# Patient Record
Sex: MALE | Race: BLACK OR AFRICAN AMERICAN | URBAN - METROPOLITAN AREA
[De-identification: names, ages, dates, MRNs, and addresses within clinical notes are randomized per-mention and may not be internally consistent; named-entity substitution may affect disease eponyms.]

---

## 2019-08-20 ENCOUNTER — HOSPITAL ENCOUNTER (OUTPATIENT)
Age: 64
Discharge: HOME | End: 2019-08-22
Payer: COMMERCIAL

## 2019-08-20 LAB — PROSTATE SPECIFIC ANTIGEN: 7.21 NG/ML (ref 0–4)

## 2019-08-20 PROCEDURE — 84153 ASSAY OF PSA TOTAL: CPT

## 2019-10-08 ENCOUNTER — HOSPITAL ENCOUNTER (OUTPATIENT)
Age: 64
Discharge: HOME | End: 2019-10-10
Payer: COMMERCIAL

## 2019-10-08 PROCEDURE — 88305 TISSUE EXAM BY PATHOLOGIST: CPT

## 2019-10-15 ENCOUNTER — HOSPITAL ENCOUNTER (OUTPATIENT)
Age: 64
Discharge: HOME | End: 2019-10-17
Payer: COMMERCIAL

## 2019-10-15 LAB
BUN BLDV-MCNC: 20 MG/DL (ref 8–23)
CREAT SERPL-MCNC: 1.3 MG/DL (ref 0.7–1.2)
GFR AFRICAN AMERICAN: >60
GFR NON-AFRICAN AMERICAN: >60 ML/MIN/1.73

## 2019-10-15 PROCEDURE — 82565 ASSAY OF CREATININE: CPT

## 2019-10-15 PROCEDURE — 84520 ASSAY OF UREA NITROGEN: CPT

## 2021-04-28 ENCOUNTER — APPOINTMENT (OUTPATIENT)
Dept: CT IMAGING | Age: 66
End: 2021-04-28
Payer: COMMERCIAL

## 2021-04-28 ENCOUNTER — HOSPITAL ENCOUNTER (INPATIENT)
Age: 66
LOS: 3 days | Discharge: HOME | DRG: 177 | End: 2021-05-01
Attending: STUDENT IN AN ORGANIZED HEALTH CARE EDUCATION/TRAINING PROGRAM | Admitting: STUDENT IN AN ORGANIZED HEALTH CARE EDUCATION/TRAINING PROGRAM
Payer: COMMERCIAL

## 2021-04-28 ENCOUNTER — HOSPITAL ENCOUNTER (EMERGENCY)
Age: 66
Discharge: ANOTHER HOSPITAL | End: 2021-04-28
Attending: EMERGENCY MEDICINE
Payer: COMMERCIAL

## 2021-04-28 ENCOUNTER — APPOINTMENT (OUTPATIENT)
Dept: GENERAL RADIOLOGY | Age: 66
End: 2021-04-28
Payer: COMMERCIAL

## 2021-04-28 VITALS
DIASTOLIC BLOOD PRESSURE: 77 MMHG | HEART RATE: 88 BPM | WEIGHT: 248 LBS | SYSTOLIC BLOOD PRESSURE: 128 MMHG | TEMPERATURE: 96.1 F | BODY MASS INDEX: 34.72 KG/M2 | RESPIRATION RATE: 16 BRPM | HEIGHT: 71 IN | OXYGEN SATURATION: 95 %

## 2021-04-28 DIAGNOSIS — U07.1 PNEUMONIA DUE TO COVID-19 VIRUS: Primary | ICD-10-CM

## 2021-04-28 DIAGNOSIS — J12.82 PNEUMONIA DUE TO COVID-19 VIRUS: Primary | ICD-10-CM

## 2021-04-28 DIAGNOSIS — J96.01 ACUTE RESPIRATORY FAILURE WITH HYPOXIA (HCC): ICD-10-CM

## 2021-04-28 LAB
ALBUMIN SERPL-MCNC: 3.6 G/DL (ref 3.5–5.2)
ALP BLD-CCNC: 91 U/L (ref 40–129)
ALT SERPL-CCNC: 37 U/L (ref 0–40)
ANION GAP SERPL CALCULATED.3IONS-SCNC: 10 MMOL/L (ref 7–16)
ANION GAP SERPL CALCULATED.3IONS-SCNC: 12 MMOL/L (ref 7–16)
AST SERPL-CCNC: 36 U/L (ref 0–39)
BASOPHILS ABSOLUTE: 0 E9/L (ref 0–0.2)
BASOPHILS ABSOLUTE: 0 E9/L (ref 0–0.2)
BASOPHILS RELATIVE PERCENT: 0 % (ref 0–2)
BASOPHILS RELATIVE PERCENT: 0 % (ref 0–2)
BILIRUB SERPL-MCNC: 0.7 MG/DL (ref 0–1.2)
BUN BLDV-MCNC: 22 MG/DL (ref 6–23)
BUN BLDV-MCNC: 26 MG/DL (ref 6–23)
CALCIUM SERPL-MCNC: 10.6 MG/DL (ref 8.6–10.2)
CALCIUM SERPL-MCNC: 10.8 MG/DL (ref 8.6–10.2)
CHLORIDE BLD-SCNC: 99 MMOL/L (ref 98–107)
CHLORIDE BLD-SCNC: 99 MMOL/L (ref 98–107)
CO2: 22 MMOL/L (ref 22–29)
CO2: 22 MMOL/L (ref 22–29)
CREAT SERPL-MCNC: 1.3 MG/DL (ref 0.7–1.2)
CREAT SERPL-MCNC: 1.3 MG/DL (ref 0.7–1.2)
EKG ATRIAL RATE: 94 BPM
EKG P AXIS: 14 DEGREES
EKG P-R INTERVAL: 182 MS
EKG Q-T INTERVAL: 340 MS
EKG QRS DURATION: 82 MS
EKG QTC CALCULATION (BAZETT): 425 MS
EKG R AXIS: -10 DEGREES
EKG T AXIS: 17 DEGREES
EKG VENTRICULAR RATE: 94 BPM
EOSINOPHILS ABSOLUTE: 0 E9/L (ref 0.05–0.5)
EOSINOPHILS ABSOLUTE: 0 E9/L (ref 0.05–0.5)
EOSINOPHILS RELATIVE PERCENT: 0 % (ref 0–6)
EOSINOPHILS RELATIVE PERCENT: 0 % (ref 0–6)
GFR AFRICAN AMERICAN: >60
GFR AFRICAN AMERICAN: >60
GFR NON-AFRICAN AMERICAN: >60 ML/MIN/1.73
GFR NON-AFRICAN AMERICAN: >60 ML/MIN/1.73
GLUCOSE BLD-MCNC: 283 MG/DL (ref 74–99)
GLUCOSE BLD-MCNC: 365 MG/DL (ref 74–99)
HBA1C MFR BLD: 9 % (ref 4–5.6)
HCT VFR BLD CALC: 40.7 % (ref 37–54)
HCT VFR BLD CALC: 42.1 % (ref 37–54)
HEMOGLOBIN: 13.9 G/DL (ref 12.5–16.5)
HEMOGLOBIN: 14.3 G/DL (ref 12.5–16.5)
LYMPHOCYTES ABSOLUTE: 0.3 E9/L (ref 1.5–4)
LYMPHOCYTES ABSOLUTE: 0.34 E9/L (ref 1.5–4)
LYMPHOCYTES RELATIVE PERCENT: 7 % (ref 20–42)
LYMPHOCYTES RELATIVE PERCENT: 8.7 % (ref 20–42)
MCH RBC QN AUTO: 30 PG (ref 26–35)
MCH RBC QN AUTO: 30 PG (ref 26–35)
MCHC RBC AUTO-ENTMCNC: 34 % (ref 32–34.5)
MCHC RBC AUTO-ENTMCNC: 34.2 % (ref 32–34.5)
MCV RBC AUTO: 87.7 FL (ref 80–99.9)
MCV RBC AUTO: 88.3 FL (ref 80–99.9)
METER GLUCOSE: 369 MG/DL (ref 74–99)
MONOCYTES ABSOLUTE: 0.19 E9/L (ref 0.1–0.95)
MONOCYTES ABSOLUTE: 0.3 E9/L (ref 0.1–0.95)
MONOCYTES RELATIVE PERCENT: 5.2 % (ref 2–12)
MONOCYTES RELATIVE PERCENT: 7 % (ref 2–12)
NEUTROPHILS ABSOLUTE: 3.27 E9/L (ref 1.8–7.3)
NEUTROPHILS ABSOLUTE: 3.7 E9/L (ref 1.8–7.3)
NEUTROPHILS RELATIVE PERCENT: 86 % (ref 43–80)
NEUTROPHILS RELATIVE PERCENT: 86.1 % (ref 43–80)
NUCLEATED RED BLOOD CELLS: 0 /100 WBC
PDW BLD-RTO: 11.6 FL (ref 11.5–15)
PDW BLD-RTO: 11.6 FL (ref 11.5–15)
PLATELET # BLD: 183 E9/L (ref 130–450)
PLATELET # BLD: 195 E9/L (ref 130–450)
PMV BLD AUTO: 9.6 FL (ref 7–12)
PMV BLD AUTO: 9.8 FL (ref 7–12)
POLYCHROMASIA: ABNORMAL
POTASSIUM REFLEX MAGNESIUM: 5 MMOL/L (ref 3.5–5)
POTASSIUM SERPL-SCNC: 4.7 MMOL/L (ref 3.5–5)
PROCALCITONIN: 0.17 NG/ML (ref 0–0.08)
RBC # BLD: 4.64 E12/L (ref 3.8–5.8)
RBC # BLD: 4.77 E12/L (ref 3.8–5.8)
RBC # BLD: NORMAL 10*6/UL
SARS-COV-2, NAAT: DETECTED
SODIUM BLD-SCNC: 131 MMOL/L (ref 132–146)
SODIUM BLD-SCNC: 133 MMOL/L (ref 132–146)
TOTAL PROTEIN: 6.9 G/DL (ref 6.4–8.3)
TROPONIN: <0.01 NG/ML (ref 0–0.03)
WBC # BLD: 3.8 E9/L (ref 4.5–11.5)
WBC # BLD: 4.3 E9/L (ref 4.5–11.5)

## 2021-04-28 PROCEDURE — 93005 ELECTROCARDIOGRAM TRACING: CPT | Performed by: EMERGENCY MEDICINE

## 2021-04-28 PROCEDURE — 84145 PROCALCITONIN (PCT): CPT

## 2021-04-28 PROCEDURE — 71275 CT ANGIOGRAPHY CHEST: CPT

## 2021-04-28 PROCEDURE — 99285 EMERGENCY DEPT VISIT HI MDM: CPT

## 2021-04-28 PROCEDURE — 6370000000 HC RX 637 (ALT 250 FOR IP): Performed by: INTERNAL MEDICINE

## 2021-04-28 PROCEDURE — 94660 CPAP INITIATION&MGMT: CPT

## 2021-04-28 PROCEDURE — 93010 ELECTROCARDIOGRAM REPORT: CPT | Performed by: INTERNAL MEDICINE

## 2021-04-28 PROCEDURE — 82962 GLUCOSE BLOOD TEST: CPT

## 2021-04-28 PROCEDURE — 2500000003 HC RX 250 WO HCPCS: Performed by: STUDENT IN AN ORGANIZED HEALTH CARE EDUCATION/TRAINING PROGRAM

## 2021-04-28 PROCEDURE — 85025 COMPLETE CBC W/AUTO DIFF WBC: CPT

## 2021-04-28 PROCEDURE — XW033E5 INTRODUCTION OF REMDESIVIR ANTI-INFECTIVE INTO PERIPHERAL VEIN, PERCUTANEOUS APPROACH, NEW TECHNOLOGY GROUP 5: ICD-10-PCS | Performed by: STUDENT IN AN ORGANIZED HEALTH CARE EDUCATION/TRAINING PROGRAM

## 2021-04-28 PROCEDURE — 36415 COLL VENOUS BLD VENIPUNCTURE: CPT

## 2021-04-28 PROCEDURE — 6360000002 HC RX W HCPCS: Performed by: EMERGENCY MEDICINE

## 2021-04-28 PROCEDURE — 80053 COMPREHEN METABOLIC PANEL: CPT

## 2021-04-28 PROCEDURE — 87449 NOS EACH ORGANISM AG IA: CPT

## 2021-04-28 PROCEDURE — 6360000002 HC RX W HCPCS: Performed by: STUDENT IN AN ORGANIZED HEALTH CARE EDUCATION/TRAINING PROGRAM

## 2021-04-28 PROCEDURE — 71046 X-RAY EXAM CHEST 2 VIEWS: CPT

## 2021-04-28 PROCEDURE — 6370000000 HC RX 637 (ALT 250 FOR IP): Performed by: STUDENT IN AN ORGANIZED HEALTH CARE EDUCATION/TRAINING PROGRAM

## 2021-04-28 PROCEDURE — 99251 PR INITL INPATIENT CONSULT NEW/ESTAB PT 20 MIN: CPT | Performed by: INTERNAL MEDICINE

## 2021-04-28 PROCEDURE — 80048 BASIC METABOLIC PNL TOTAL CA: CPT

## 2021-04-28 PROCEDURE — 83036 HEMOGLOBIN GLYCOSYLATED A1C: CPT

## 2021-04-28 PROCEDURE — 99222 1ST HOSP IP/OBS MODERATE 55: CPT | Performed by: STUDENT IN AN ORGANIZED HEALTH CARE EDUCATION/TRAINING PROGRAM

## 2021-04-28 PROCEDURE — 74176 CT ABD & PELVIS W/O CONTRAST: CPT

## 2021-04-28 PROCEDURE — 2060000000 HC ICU INTERMEDIATE R&B

## 2021-04-28 PROCEDURE — 84484 ASSAY OF TROPONIN QUANT: CPT

## 2021-04-28 PROCEDURE — 6360000004 HC RX CONTRAST MEDICATION: Performed by: RADIOLOGY

## 2021-04-28 PROCEDURE — 2580000003 HC RX 258: Performed by: STUDENT IN AN ORGANIZED HEALTH CARE EDUCATION/TRAINING PROGRAM

## 2021-04-28 PROCEDURE — 87635 SARS-COV-2 COVID-19 AMP PRB: CPT

## 2021-04-28 RX ORDER — KETOROLAC TROMETHAMINE 30 MG/ML
15 INJECTION, SOLUTION INTRAMUSCULAR; INTRAVENOUS ONCE
Status: COMPLETED | OUTPATIENT
Start: 2021-04-28 | End: 2021-04-28

## 2021-04-28 RX ORDER — SODIUM CHLORIDE 0.9 % (FLUSH) 0.9 %
5-40 SYRINGE (ML) INJECTION PRN
Status: DISCONTINUED | OUTPATIENT
Start: 2021-04-28 | End: 2021-05-01 | Stop reason: HOSPADM

## 2021-04-28 RX ORDER — INSULIN GLARGINE 100 [IU]/ML
0.25 INJECTION, SOLUTION SUBCUTANEOUS NIGHTLY
Status: DISCONTINUED | OUTPATIENT
Start: 2021-04-28 | End: 2021-04-28

## 2021-04-28 RX ORDER — ACETAMINOPHEN 325 MG/1
650 TABLET ORAL EVERY 6 HOURS PRN
Status: DISCONTINUED | OUTPATIENT
Start: 2021-04-28 | End: 2021-05-01 | Stop reason: HOSPADM

## 2021-04-28 RX ORDER — HYDRALAZINE HYDROCHLORIDE 20 MG/ML
10 INJECTION INTRAMUSCULAR; INTRAVENOUS EVERY 6 HOURS PRN
Status: DISCONTINUED | OUTPATIENT
Start: 2021-04-28 | End: 2021-05-01 | Stop reason: HOSPADM

## 2021-04-28 RX ORDER — LABETALOL 100 MG/1
300 TABLET, FILM COATED ORAL 2 TIMES DAILY
Status: DISCONTINUED | OUTPATIENT
Start: 2021-04-28 | End: 2021-05-01 | Stop reason: HOSPADM

## 2021-04-28 RX ORDER — LOSARTAN POTASSIUM 100 MG/1
100 TABLET ORAL DAILY
COMMUNITY

## 2021-04-28 RX ORDER — DEXAMETHASONE SODIUM PHOSPHATE 4 MG/ML
6 INJECTION, SOLUTION INTRA-ARTICULAR; INTRALESIONAL; INTRAMUSCULAR; INTRAVENOUS; SOFT TISSUE EVERY 24 HOURS
Status: DISCONTINUED | OUTPATIENT
Start: 2021-04-28 | End: 2021-05-01 | Stop reason: HOSPADM

## 2021-04-28 RX ORDER — ONDANSETRON 2 MG/ML
4 INJECTION INTRAMUSCULAR; INTRAVENOUS EVERY 6 HOURS PRN
Status: DISCONTINUED | OUTPATIENT
Start: 2021-04-28 | End: 2021-05-01 | Stop reason: HOSPADM

## 2021-04-28 RX ORDER — ATORVASTATIN CALCIUM 40 MG/1
40 TABLET, FILM COATED ORAL DAILY
COMMUNITY

## 2021-04-28 RX ORDER — INSULIN GLARGINE 100 [IU]/ML
32 INJECTION, SOLUTION SUBCUTANEOUS NIGHTLY
Status: DISCONTINUED | OUTPATIENT
Start: 2021-04-28 | End: 2021-04-29

## 2021-04-28 RX ORDER — LOSARTAN POTASSIUM 50 MG/1
100 TABLET ORAL DAILY
Status: DISCONTINUED | OUTPATIENT
Start: 2021-04-28 | End: 2021-05-01 | Stop reason: HOSPADM

## 2021-04-28 RX ORDER — VITAMIN B COMPLEX
2000 TABLET ORAL DAILY
Status: DISCONTINUED | OUTPATIENT
Start: 2021-04-28 | End: 2021-05-01 | Stop reason: HOSPADM

## 2021-04-28 RX ORDER — DEXAMETHASONE SODIUM PHOSPHATE 10 MG/ML
6 INJECTION INTRAMUSCULAR; INTRAVENOUS ONCE
Status: COMPLETED | OUTPATIENT
Start: 2021-04-28 | End: 2021-04-28

## 2021-04-28 RX ORDER — ZINC SULFATE 50(220)MG
50 CAPSULE ORAL DAILY
Status: DISCONTINUED | OUTPATIENT
Start: 2021-04-28 | End: 2021-05-01 | Stop reason: HOSPADM

## 2021-04-28 RX ORDER — SODIUM CHLORIDE 9 MG/ML
25 INJECTION, SOLUTION INTRAVENOUS PRN
Status: DISCONTINUED | OUTPATIENT
Start: 2021-04-28 | End: 2021-05-01 | Stop reason: HOSPADM

## 2021-04-28 RX ORDER — 0.9 % SODIUM CHLORIDE 0.9 %
30 INTRAVENOUS SOLUTION INTRAVENOUS PRN
Status: DISCONTINUED | OUTPATIENT
Start: 2021-04-28 | End: 2021-05-01 | Stop reason: HOSPADM

## 2021-04-28 RX ORDER — LABETALOL 300 MG/1
300 TABLET, FILM COATED ORAL 2 TIMES DAILY
COMMUNITY

## 2021-04-28 RX ORDER — GUAIFENESIN/DEXTROMETHORPHAN 100-10MG/5
5 SYRUP ORAL EVERY 4 HOURS PRN
Status: DISCONTINUED | OUTPATIENT
Start: 2021-04-28 | End: 2021-05-01 | Stop reason: HOSPADM

## 2021-04-28 RX ORDER — DEXTROSE MONOHYDRATE 50 MG/ML
100 INJECTION, SOLUTION INTRAVENOUS PRN
Status: DISCONTINUED | OUTPATIENT
Start: 2021-04-28 | End: 2021-05-01 | Stop reason: HOSPADM

## 2021-04-28 RX ORDER — SODIUM CHLORIDE 9 MG/ML
INJECTION, SOLUTION INTRAVENOUS CONTINUOUS
Status: DISCONTINUED | OUTPATIENT
Start: 2021-04-28 | End: 2021-04-29

## 2021-04-28 RX ORDER — SODIUM CHLORIDE 0.9 % (FLUSH) 0.9 %
5-40 SYRINGE (ML) INJECTION EVERY 12 HOURS SCHEDULED
Status: DISCONTINUED | OUTPATIENT
Start: 2021-04-28 | End: 2021-05-01 | Stop reason: HOSPADM

## 2021-04-28 RX ORDER — POLYETHYLENE GLYCOL 3350 17 G/17G
17 POWDER, FOR SOLUTION ORAL DAILY PRN
Status: DISCONTINUED | OUTPATIENT
Start: 2021-04-28 | End: 2021-05-01 | Stop reason: HOSPADM

## 2021-04-28 RX ORDER — PROMETHAZINE HYDROCHLORIDE 25 MG/1
12.5 TABLET ORAL EVERY 6 HOURS PRN
Status: DISCONTINUED | OUTPATIENT
Start: 2021-04-28 | End: 2021-05-01 | Stop reason: HOSPADM

## 2021-04-28 RX ORDER — NICOTINE POLACRILEX 4 MG
15 LOZENGE BUCCAL PRN
Status: DISCONTINUED | OUTPATIENT
Start: 2021-04-28 | End: 2021-05-01 | Stop reason: HOSPADM

## 2021-04-28 RX ORDER — ATORVASTATIN CALCIUM 40 MG/1
40 TABLET, FILM COATED ORAL DAILY
Status: DISCONTINUED | OUTPATIENT
Start: 2021-04-28 | End: 2021-05-01 | Stop reason: HOSPADM

## 2021-04-28 RX ORDER — IPRATROPIUM BROMIDE AND ALBUTEROL SULFATE 2.5; .5 MG/3ML; MG/3ML
1 SOLUTION RESPIRATORY (INHALATION) EVERY 6 HOURS PRN
Status: DISCONTINUED | OUTPATIENT
Start: 2021-04-28 | End: 2021-04-28 | Stop reason: CLARIF

## 2021-04-28 RX ORDER — ASCORBIC ACID 500 MG
500 TABLET ORAL DAILY
Status: DISCONTINUED | OUTPATIENT
Start: 2021-04-28 | End: 2021-05-01 | Stop reason: HOSPADM

## 2021-04-28 RX ORDER — DEXTROSE MONOHYDRATE 25 G/50ML
12.5 INJECTION, SOLUTION INTRAVENOUS PRN
Status: DISCONTINUED | OUTPATIENT
Start: 2021-04-28 | End: 2021-05-01 | Stop reason: HOSPADM

## 2021-04-28 RX ORDER — ACETAMINOPHEN 650 MG/1
650 SUPPOSITORY RECTAL EVERY 6 HOURS PRN
Status: DISCONTINUED | OUTPATIENT
Start: 2021-04-28 | End: 2021-05-01 | Stop reason: HOSPADM

## 2021-04-28 RX ADMIN — ENOXAPARIN SODIUM 30 MG: 30 INJECTION SUBCUTANEOUS at 20:33

## 2021-04-28 RX ADMIN — INSULIN LISPRO 15 UNITS: 100 INJECTION, SOLUTION INTRAVENOUS; SUBCUTANEOUS at 16:53

## 2021-04-28 RX ADMIN — LOSARTAN POTASSIUM 100 MG: 50 TABLET, FILM COATED ORAL at 15:20

## 2021-04-28 RX ADMIN — SODIUM CHLORIDE, PRESERVATIVE FREE 10 ML: 5 INJECTION INTRAVENOUS at 20:34

## 2021-04-28 RX ADMIN — DEXAMETHASONE SODIUM PHOSPHATE 6 MG: 4 INJECTION, SOLUTION INTRAMUSCULAR; INTRAVENOUS at 15:20

## 2021-04-28 RX ADMIN — INSULIN LISPRO 7 UNITS: 100 INJECTION, SOLUTION INTRAVENOUS; SUBCUTANEOUS at 20:35

## 2021-04-28 RX ADMIN — ATORVASTATIN CALCIUM 40 MG: 40 TABLET, FILM COATED ORAL at 15:20

## 2021-04-28 RX ADMIN — Medication 500 MG: at 15:54

## 2021-04-28 RX ADMIN — INSULIN GLARGINE 32 UNITS: 100 INJECTION, SOLUTION SUBCUTANEOUS at 20:35

## 2021-04-28 RX ADMIN — Medication 2000 UNITS: at 15:20

## 2021-04-28 RX ADMIN — LABETALOL HYDROCHLORIDE 300 MG: 100 TABLET, FILM COATED ORAL at 20:32

## 2021-04-28 RX ADMIN — REMDESIVIR 200 MG: 100 INJECTION, POWDER, LYOPHILIZED, FOR SOLUTION INTRAVENOUS at 15:19

## 2021-04-28 RX ADMIN — KETOROLAC TROMETHAMINE 15 MG: 30 INJECTION, SOLUTION INTRAMUSCULAR; INTRAVENOUS at 08:52

## 2021-04-28 RX ADMIN — IOPAMIDOL 75 ML: 755 INJECTION, SOLUTION INTRAVENOUS at 11:37

## 2021-04-28 RX ADMIN — SODIUM CHLORIDE: 9 INJECTION, SOLUTION INTRAVENOUS at 16:51

## 2021-04-28 RX ADMIN — ZINC SULFATE 220 MG (50 MG) CAPSULE 50 MG: CAPSULE at 15:19

## 2021-04-28 RX ADMIN — DEXAMETHASONE SODIUM PHOSPHATE 6 MG: 10 INJECTION INTRAMUSCULAR; INTRAVENOUS at 10:19

## 2021-04-28 ASSESSMENT — PAIN SCALES - GENERAL: PAINLEVEL_OUTOF10: 5

## 2021-04-28 NOTE — CONSULTS
ENDOCRINOLOGY INITIAL CONSULTATION NOTE      Date of admission: 4/28/2021  Date of service: 4/28/2021  Admitting physician: Bill Peralta MD   Primary Care Physician: Luis Smith MD  Consultant physician: Bri Quintana MD     Reason for the consultation:  Uncontrolled DM    History of Present Illness: The history is provided by the patient. Accuracy of the patient data is excellent    Binh Torres is a very pleasant 63 y.o. old male with PMH DM type 2 admitted to [facility] on 4/28/2021 because of SOB and cough and found to have COVID, endocrine service was consulted for diabetes management. The pt was in his usual state of health until about 2 weeks ago when started c/o SOB and cough. He initially went to urgent care and tested negative for Covid and chest x-ray was unremarkable. Over the past few days, his symptoms worsened which prompted him to come to ER where he tested positive for covid 19 and on radiological examination had Covid related pneumonia. the pt reported low grade fever but denied CP, palpitation, N/V/D    Prior to admission  The patient was diagnosed with type 2 DM about 20 years ago. Prior to admission patient was on Metformin er 750 mg daily, Glyburide 5 mg daily, Ozempic 0.25 mg/wk. Patient has had no hypoglycemic episodes. Patient has not been eating consistent carbohydrate meals, self-blood glucose monitoring has been above goal prior to admission. In addition, patient denied macrovascular or microvascular complications  Lab Results   Component Value Date    LABA1C 9.8 11/07/2015       Inpatient diet:   Carb Restricted diet     Point of care glucose monitoring   (Independently reviewed)   Recent Labs     04/28/21  1651   GLUMET 369*       Past medical history:   Past Medical History:   Diagnosis Date    Diabetes mellitus ([address]     Hyperlipidemia     Hypertension        Past surgical history:  No past surgical history on file.     Social history:   Tobacco:   reports that he has never allowing us to participate in the care of this patient. Please do not hesitate to contact us with any additional questions. Denton Tomlinson MD  Endocrinologist, [facility] Endocrinology   [address] [facility] [address]   Phone: [phone]  Fax: [phone]  --------------------------------------------  An electronic signature was used to authenticate this note.  Amelia Martinez MD on 4/28/2021 at 7:56 PM

## 2021-04-28 NOTE — CARE COORDINATION
CM note. Bed is available at [facility]. Pt is agreeable to transfer.   Belinda Magana RN CM

## 2021-04-28 NOTE — H&P
Historical Provider, MD   metFORMIN (GLUCOPHAGE) 1000 MG tablet Take 1,000 mg by mouth 2 times daily (with meals). Historical Provider, MD   glyBURIDE (DIABETA) 5 MG tablet Take 10 mg by mouth daily (with breakfast). Historical Provider, MD   saxagliptin (ONGLYZA) 5 MG TABS tablet Take 5 mg by mouth daily. Historical Provider, MD       Allergies:    Patient has no known allergies. Social History:    has an unknown smoking status. He has never used smokeless tobacco. He reports that he does not drink alcohol or use drugs. Family History:   family history is not on file. PHYSICAL EXAM:  Vitals: There were no vitals taken for this visit. General Appearance: alert and oriented to person, place and time and in no acute distress, 4 L of oxygen via nasal cannula  Skin: warm and dry  Head: normocephalic and atraumatic  Eyes: pupils equal, round, and reactive to light, extraocular eye movements intact, conjunctivae normal  Neck: neck supple and non tender without mass   Pulmonary/Chest: clear to auscultation bilaterally - no wheezes, +rales or rhonchi, diminished bibasilar air movement, no respiratory distress  Cardiovascular: normal rate, normal S1 and S2 and no carotid bruits  Abdomen: soft, non-tender, non-distended, normal bowel sounds, no masses or organomegaly  Extremities: no cyanosis, no clubbing and no edema, no calf tenderness  Neurologic: no cranial nerve deficit and speech normal        LABS:  Recent Labs     04/28/21  0841   *   K 4.7   CL 99   CO2 22   BUN 22   CREATININE 1.3*   GLUCOSE 283*   CALCIUM 10.6*       Recent Labs     04/28/21  0841   WBC 4.3*   RBC 4.64   HGB 13.9   HCT 40.7   MCV 87.7   MCH 30.0   MCHC 34.2   RDW 11.6      MPV 9.8       No results for input(s): POCGLU in the last 72 hours.       Radiology:   No orders to display       EKG: NSR    ASSESSMENT:      Active Problems:    Pneumonia due to COVID-19 virus  Resolved Problems:    * No resolved hospital

## 2021-04-28 NOTE — ED NOTES
Pt placed on NC @ 3lpm due to low pulse ox.     Spo2 improves to 89%     Annalise Hankins, RN  04/28/21 0411

## 2021-04-28 NOTE — ED NOTES
Called N2N To Jing James RN at [facility]  04/28/21 9552

## 2021-04-28 NOTE — ED PROVIDER NOTES
Department of Emergency Medicine   ED  Provider Note  Admit Date/RoomTime: 4/28/2021  8:32 AM  ED Room: 32/32          History of Present Illness:  4/28/21, Time: 8:46 AM EDT  Chief Complaint   Patient presents with    Shortness of Breath     intermittent for 2 weeks. denies chest pain. has seen PCP for issue but no definitive answer. reporting also to have productive cough with clear sputum    Back Pain     also reporting low back pain. denies accident/injury. denies heavy lifting                Brice Roca is a 63 y.o. male presenting to the ED for shortness of breath, beginning almost 2 weeks ago. The complaint has been persistent, moderate in severity, and worsened by activity. Patient reports worsening shortness of breath with sinus congestion and clear sputum for the last 2 weeks. He reports that he also feels achy. He reports he went to urgent care and had a chest x-ray and a negative Covid test.  He reports he continues to worsen which is what prompted his visit. He reports low-grade fevers as well and sweats. He denies chest pain. No orthopnea. No calf swelling or peripheral edema. He denies history of DVT or PE. He denies known Covid exposure    Review of Systems:   A complete review of systems was performed and pertinent positives and negatives are stated within HPI, all other systems reviewed and are negative.        --------------------------------------------- PAST HISTORY ---------------------------------------------  Past Medical History:  has a past medical history of Diabetes mellitus ([facility] [address] Hyperlipidemia, and Hypertension. Past Surgical History: denies surgical history     Social History:  has an unknown smoking status. He has never used smokeless tobacco. He reports that he does not drink alcohol or use drugs. Family History: family history is not on file. . Unless otherwise noted, family history is non contributory    The patients home medications have been reviewed. Allergies: Patient has no known allergies. I have reviewed the past medical history, past surgical history, social history, and family history    ---------------------------------------------------PHYSICAL EXAM--------------------------------------    Constitutional/General: Alert and oriented x3  Head: Normocephalic and atraumatic  Eyes: PERRL, EOMI, sclera non icteric  ENT: Oropharynx clear, handling secretions  Neck: Supple, full ROM, no stridor, no meningeal signs  Respiratory: Lungs with crackles bilaterally. Not in respiratory distress  Cardiovascular:  Regular rate. Regular rhythm. No murmurs, no aortic murmurs, no gallops, no rubs. 2+ distal pulses. Equal extremity pulses. Gastrointestinal:  Abdomen Soft, Non tender, Non distended. No rebound, guarding, or rigidity. No pulsatile masses. Musculoskeletal: Moves all extremities x 4. Warm and well perfused, no clubbing, no cyanosis, no edema. Capillary refill <3 seconds. No calf swelling or tenderness or palpable cords. Skin: skin warm and dry. No rashes. Neurologic: GCS 15, no focal deficits,  Psychiatric: Normal Affect          -------------------------------------------------- RESULTS -------------------------------------------------  I have personally reviewed all laboratory and imaging results for this patient. Results are listed below.      LABS: (Lab results interpreted by me)  Results for orders placed or performed during the hospital encounter of 04/28/21   COVID-19, Rapid    Specimen: Nasopharyngeal Swab   Result Value Ref Range    SARS-CoV-2, NAAT DETECTED (A) Not Detected   CBC Auto Differential   Result Value Ref Range    WBC 4.3 (L) 4.5 - 11.5 E9/L    RBC 4.64 3.80 - 5.80 E12/L    Hemoglobin 13.9 12.5 - 16.5 g/dL    Hematocrit 40.7 37.0 - 54.0 %    MCV 87.7 80.0 - 99.9 fL    MCH 30.0 26.0 - 35.0 pg    MCHC 34.2 32.0 - 34.5 %    RDW 11.6 11.5 - 15.0 fL    Platelets 253 172 - 860 E9/L    MPV 9.8 7.0 - 12.0 fL    Neutrophils % 86.0 (H) 43.0 - 80.0 %    Lymphocytes % 7.0 (L) 20.0 - 42.0 %    Monocytes % 7.0 2.0 - 12.0 %    Eosinophils % 0.0 0.0 - 6.0 %    Basophils % 0.0 0.0 - 2.0 %    Neutrophils Absolute 3.70 1.80 - 7.30 E9/L    Lymphocytes Absolute 0.30 (L) 1.50 - 4.00 E9/L    Monocytes Absolute 0.30 0.10 - 0.95 E9/L    Eosinophils Absolute 0.00 (L) 0.05 - 0.50 E9/L    Basophils Absolute 0.00 0.00 - 0.20 E9/L    RBC Morphology Normal    Basic Metabolic Panel   Result Value Ref Range    Sodium 131 (L) 132 - 146 mmol/L    Potassium 4.7 3.5 - 5.0 mmol/L    Chloride 99 98 - 107 mmol/L    CO2 22 22 - 29 mmol/L    Anion Gap 10 7 - 16 mmol/L    Glucose 283 (H) 74 - 99 mg/dL    BUN 22 6 - 23 mg/dL    CREATININE 1.3 (H) 0.7 - 1.2 mg/dL    GFR Non-African American >60 >=60 mL/min/1.73    GFR African American >60     Calcium 10.6 (H) 8.6 - 10.2 mg/dL   Troponin   Result Value Ref Range    Troponin <0.01 0.00 - 0.03 ng/mL   ,       RADIOLOGY:  Interpreted by Radiologist unless otherwise specified  CTA PULMONARY W CONTRAST   Final Result   1. No evidence of pulmonary artery embolism on this motion degraded exam.   2. Bilateral ground-glass consolidations and interstitial thickening. Commonly reported imaging features of COVID-19 pneumonia are present. Other   processes such as influenza pneumonia and organizing pneumonia (as can be   seen with drug toxicity and connective tissue disease) can cause a similar   imaging pattern. CT ABDOMEN PELVIS WO CONTRAST Additional Contrast? None   Final Result   1. No evidence for renal calculus or obstructive uropathy in the right or in   the left kidneys. 2.  No indication for acute intraperitoneal retroperitoneal process in the   abdomen or in the pelvis. 3.  Extensive bilateral pulmonary parenchymal opacities above discussed. See   also report of CTA of the chest performed same occasion. XR CHEST (2 VW)   Final Result   Multifocal ill-defined infiltrates.   These are nonspecific but could reflect   a COVID-19 pattern. Clinical correlation is recommended. Questionable sclerotic vertebral bodies. Consider CT chest for further   evaluation               EKG Interpretation  Interpreted by emergency department physician, Dr. Aldair Ngo     Date of EK21  Time: 541    Rhythm: normal sinus   Rate: normal  Axis: normal  Conduction: normal  ST Segments: no acute change  T Waves: no acute change    Clinical Impression: Sinus rhythm, no acute ischemic changes    Comparison to prior EKG: no previous EKG      ------------------------- NURSING NOTES AND VITALS REVIEWED ---------------------------   The nursing notes within the ED encounter and vital signs as below have been reviewed by myself  /77   Pulse 88   Temp 96.1 °F (35.6 °C)   Resp 16   Ht 5' 11\" (1.803 m)   Wt 248 lb (112.5 kg)   SpO2 95%   BMI 34.59 kg/m²     Oxygen Saturation Interpretation: Abnormal and Improved after treatment    The patients available past medical records and past encounters were reviewed. ------------------------------ ED COURSE/MEDICAL DECISION MAKING----------------------  Medications   ketorolac (TORADOL) injection 15 mg (15 mg Intravenous Given 21 4213)   dexamethasone (DECADRON) injection 6 mg (6 mg Intravenous Given 21 1019)   iopamidol (ISOVUE-370) 76 % injection 75 mL (75 mLs Intravenous Given 21 1137)               I, Dr. Aldair Ngo, am the primary provider of record    Medical Decision Making:   COVID-19 pneumonia, acute respiratory failure with hypoxemia associated secondary to this. He is not in distress. CT angiogram negative for PE. Currently [facility] is boarding over 35 patients in the ED and there were no inpatient beds. Our sister facility at [facility] is only at 50% capacity and it was recommended he be transferred there for admission as again we are at capacity. He was agreeable to transfer and is stable for transfer.   I spoke with [facility] hospitalist who accepted him for transfer. Oxygen Saturation Interpretation: 83 % on RA. Abnormal and Improved after treatment      Re-Evaluations:       improving with 02      This patient's ED course included: a personal history and physicial examination, re-evaluation prior to disposition, multiple bedside re-evaluations, IV medications, cardiac monitoring, continuous pulse oximetry and complex medical decision making and emergency management    This patient has remained hemodynamically stable during their ED course. Consultations:  Dr. Zac Castellanos from [address] [place] [address], she accepts for admission        Counseling: The emergency provider has spoken with the patient and discussed todays results, in addition to providing specific details for the plan of care and counseling regarding the diagnosis and prognosis. Questions are answered at this time and they are agreeable with the plan.       --------------------------------- IMPRESSION AND DISPOSITION ---------------------------------    IMPRESSION  1. Pneumonia due to COVID-19 virus    2. Acute respiratory failure with hypoxia (HCC)        DISPOSITION  Disposition: Admit to telemetry at [facility]  Patient condition is stable        NOTE: This report was transcribed using voice recognition software.  Every effort was made to ensure accuracy; however, inadvertent computerized transcription errors may be present       Alireza Durant MD  04/28/21 5591

## 2021-04-29 LAB
ALBUMIN SERPL-MCNC: 3.2 G/DL (ref 3.5–5.2)
ALP BLD-CCNC: 86 U/L (ref 40–129)
ALT SERPL-CCNC: 33 U/L (ref 0–40)
ANION GAP SERPL CALCULATED.3IONS-SCNC: 12 MMOL/L (ref 7–16)
AST SERPL-CCNC: 31 U/L (ref 0–39)
BASOPHILS ABSOLUTE: 0 E9/L (ref 0–0.2)
BASOPHILS RELATIVE PERCENT: 0 % (ref 0–2)
BILIRUB SERPL-MCNC: 0.5 MG/DL (ref 0–1.2)
BUN BLDV-MCNC: 28 MG/DL (ref 6–23)
BURR CELLS: ABNORMAL
C-REACTIVE PROTEIN: 7.7 MG/DL (ref 0–0.4)
CALCIUM SERPL-MCNC: 10.8 MG/DL (ref 8.6–10.2)
CHLORIDE BLD-SCNC: 102 MMOL/L (ref 98–107)
CO2: 20 MMOL/L (ref 22–29)
CREAT SERPL-MCNC: 1 MG/DL (ref 0.7–1.2)
D DIMER: 373 NG/ML DDU
EOSINOPHILS ABSOLUTE: 0 E9/L (ref 0.05–0.5)
EOSINOPHILS RELATIVE PERCENT: 0 % (ref 0–6)
FERRITIN: 1274 NG/ML
FIBRINOGEN: >700 MG/DL (ref 225–540)
GFR AFRICAN AMERICAN: >60
GFR NON-AFRICAN AMERICAN: >60 ML/MIN/1.73
GLUCOSE BLD-MCNC: 334 MG/DL (ref 74–99)
HCT VFR BLD CALC: 39.4 % (ref 37–54)
HEMOGLOBIN: 13.2 G/DL (ref 12.5–16.5)
INR BLD: 1.1
L. PNEUMOPHILA SEROGP 1 UR AG: NORMAL
LACTATE DEHYDROGENASE: 369 U/L (ref 135–225)
LACTIC ACID: 1.8 MMOL/L (ref 0.5–2.2)
LYMPHOCYTES ABSOLUTE: 0.23 E9/L (ref 1.5–4)
LYMPHOCYTES RELATIVE PERCENT: 5.3 % (ref 20–42)
MCH RBC QN AUTO: 29.9 PG (ref 26–35)
MCHC RBC AUTO-ENTMCNC: 33.5 % (ref 32–34.5)
MCV RBC AUTO: 89.1 FL (ref 80–99.9)
METER GLUCOSE: 311 MG/DL (ref 74–99)
METER GLUCOSE: 320 MG/DL (ref 74–99)
METER GLUCOSE: 325 MG/DL (ref 74–99)
METER GLUCOSE: 357 MG/DL (ref 74–99)
METER GLUCOSE: 373 MG/DL (ref 74–99)
MONOCYTES ABSOLUTE: 0.27 E9/L (ref 0.1–0.95)
MONOCYTES RELATIVE PERCENT: 6.1 % (ref 2–12)
NEUTROPHILS ABSOLUTE: 4.01 E9/L (ref 1.8–7.3)
NEUTROPHILS RELATIVE PERCENT: 88.6 % (ref 43–80)
NUCLEATED RED BLOOD CELLS: 0 /100 WBC
PDW BLD-RTO: 11.6 FL (ref 11.5–15)
PLATELET # BLD: 244 E9/L (ref 130–450)
PMV BLD AUTO: 9.5 FL (ref 7–12)
POIKILOCYTES: ABNORMAL
POTASSIUM REFLEX MAGNESIUM: 4.7 MMOL/L (ref 3.5–5)
PROTHROMBIN TIME: 11.9 SEC (ref 9.3–12.4)
RBC # BLD: 4.42 E12/L (ref 3.8–5.8)
SODIUM BLD-SCNC: 134 MMOL/L (ref 132–146)
STREP PNEUMONIAE ANTIGEN, URINE: NORMAL
TOTAL PROTEIN: 6.5 G/DL (ref 6.4–8.3)
VITAMIN D 25-HYDROXY: 21 NG/ML (ref 30–100)
WBC # BLD: 4.5 E9/L (ref 4.5–11.5)

## 2021-04-29 PROCEDURE — 94660 CPAP INITIATION&MGMT: CPT

## 2021-04-29 PROCEDURE — 85384 FIBRINOGEN ACTIVITY: CPT

## 2021-04-29 PROCEDURE — 85610 PROTHROMBIN TIME: CPT

## 2021-04-29 PROCEDURE — 2580000003 HC RX 258: Performed by: STUDENT IN AN ORGANIZED HEALTH CARE EDUCATION/TRAINING PROGRAM

## 2021-04-29 PROCEDURE — 83605 ASSAY OF LACTIC ACID: CPT

## 2021-04-29 PROCEDURE — 82728 ASSAY OF FERRITIN: CPT

## 2021-04-29 PROCEDURE — 86140 C-REACTIVE PROTEIN: CPT

## 2021-04-29 PROCEDURE — 85378 FIBRIN DEGRADE SEMIQUANT: CPT

## 2021-04-29 PROCEDURE — 2500000003 HC RX 250 WO HCPCS: Performed by: STUDENT IN AN ORGANIZED HEALTH CARE EDUCATION/TRAINING PROGRAM

## 2021-04-29 PROCEDURE — 6360000002 HC RX W HCPCS: Performed by: STUDENT IN AN ORGANIZED HEALTH CARE EDUCATION/TRAINING PROGRAM

## 2021-04-29 PROCEDURE — 2700000000 HC OXYGEN THERAPY PER DAY

## 2021-04-29 PROCEDURE — 82962 GLUCOSE BLOOD TEST: CPT

## 2021-04-29 PROCEDURE — 36415 COLL VENOUS BLD VENIPUNCTURE: CPT

## 2021-04-29 PROCEDURE — 6370000000 HC RX 637 (ALT 250 FOR IP): Performed by: INTERNAL MEDICINE

## 2021-04-29 PROCEDURE — 6370000000 HC RX 637 (ALT 250 FOR IP): Performed by: STUDENT IN AN ORGANIZED HEALTH CARE EDUCATION/TRAINING PROGRAM

## 2021-04-29 PROCEDURE — 82306 VITAMIN D 25 HYDROXY: CPT

## 2021-04-29 PROCEDURE — 2060000000 HC ICU INTERMEDIATE R&B

## 2021-04-29 PROCEDURE — 83615 LACTATE (LD) (LDH) ENZYME: CPT

## 2021-04-29 PROCEDURE — 85025 COMPLETE CBC W/AUTO DIFF WBC: CPT

## 2021-04-29 PROCEDURE — 99232 SBSQ HOSP IP/OBS MODERATE 35: CPT | Performed by: STUDENT IN AN ORGANIZED HEALTH CARE EDUCATION/TRAINING PROGRAM

## 2021-04-29 PROCEDURE — 80053 COMPREHEN METABOLIC PANEL: CPT

## 2021-04-29 RX ORDER — INSULIN GLARGINE 100 [IU]/ML
40 INJECTION, SOLUTION SUBCUTANEOUS NIGHTLY
Status: DISCONTINUED | OUTPATIENT
Start: 2021-04-29 | End: 2021-05-01

## 2021-04-29 RX ORDER — INSULIN GLARGINE 100 [IU]/ML
20 INJECTION, SOLUTION SUBCUTANEOUS EVERY MORNING
Status: DISCONTINUED | OUTPATIENT
Start: 2021-04-29 | End: 2021-05-01

## 2021-04-29 RX ADMIN — Medication 500 MG: at 07:57

## 2021-04-29 RX ADMIN — Medication 2000 UNITS: at 07:56

## 2021-04-29 RX ADMIN — GUAIFENESIN AND DEXTROMETHORPHAN 5 ML: 100; 10 SYRUP ORAL at 07:57

## 2021-04-29 RX ADMIN — LABETALOL HYDROCHLORIDE 300 MG: 100 TABLET, FILM COATED ORAL at 21:10

## 2021-04-29 RX ADMIN — LABETALOL HYDROCHLORIDE 300 MG: 100 TABLET, FILM COATED ORAL at 07:57

## 2021-04-29 RX ADMIN — INSULIN LISPRO 12 UNITS: 100 INJECTION, SOLUTION INTRAVENOUS; SUBCUTANEOUS at 08:00

## 2021-04-29 RX ADMIN — REMDESIVIR 100 MG: 100 INJECTION, POWDER, LYOPHILIZED, FOR SOLUTION INTRAVENOUS at 14:32

## 2021-04-29 RX ADMIN — ATORVASTATIN CALCIUM 40 MG: 40 TABLET, FILM COATED ORAL at 07:57

## 2021-04-29 RX ADMIN — ENOXAPARIN SODIUM 30 MG: 30 INJECTION SUBCUTANEOUS at 21:09

## 2021-04-29 RX ADMIN — SODIUM CHLORIDE, PRESERVATIVE FREE 10 ML: 5 INJECTION INTRAVENOUS at 21:10

## 2021-04-29 RX ADMIN — INSULIN LISPRO 12 UNITS: 100 INJECTION, SOLUTION INTRAVENOUS; SUBCUTANEOUS at 17:10

## 2021-04-29 RX ADMIN — INSULIN LISPRO 15 UNITS: 100 INJECTION, SOLUTION INTRAVENOUS; SUBCUTANEOUS at 17:10

## 2021-04-29 RX ADMIN — LOSARTAN POTASSIUM 100 MG: 50 TABLET, FILM COATED ORAL at 07:56

## 2021-04-29 RX ADMIN — ZINC SULFATE 220 MG (50 MG) CAPSULE 50 MG: CAPSULE at 07:57

## 2021-04-29 RX ADMIN — INSULIN GLARGINE 20 UNITS: 100 INJECTION, SOLUTION SUBCUTANEOUS at 10:54

## 2021-04-29 RX ADMIN — ENOXAPARIN SODIUM 30 MG: 30 INJECTION SUBCUTANEOUS at 07:55

## 2021-04-29 RX ADMIN — INSULIN GLARGINE 40 UNITS: 100 INJECTION, SOLUTION SUBCUTANEOUS at 21:10

## 2021-04-29 RX ADMIN — DEXAMETHASONE SODIUM PHOSPHATE 6 MG: 4 INJECTION, SOLUTION INTRAMUSCULAR; INTRAVENOUS at 14:32

## 2021-04-29 RX ADMIN — INSULIN LISPRO 12 UNITS: 100 INJECTION, SOLUTION INTRAVENOUS; SUBCUTANEOUS at 11:50

## 2021-04-29 RX ADMIN — INSULIN LISPRO 7 UNITS: 100 INJECTION, SOLUTION INTRAVENOUS; SUBCUTANEOUS at 21:11

## 2021-04-29 NOTE — PROGRESS NOTES
Date: 4/28/2021    Time: 10:44 PM    Patient Placed On BIPAP/CPAP/ Non-Invasive Ventilation? Yes    If no must comment. Facial area red/color change? No           If YES are Blister/Lesion present? No   If yes must notify nursing staff  BIPAP/CPAP skin barrier?   Yes    Skin barrier type:mepilexlite       Comments:        Parker Dawkins

## 2021-04-29 NOTE — PROGRESS NOTES
Date: 4/29/2021    Time: 0035    Patient Placed On BIPAP/CPAP/ Non-Invasive Ventilation? No    If no must comment. Facial area red/color change? If YES are Blister/Lesion present? If yes must notify nursing staff  BIPAP/CPAP skin barrier?   Yes    Skin barrier type:mepilexlite       Comments:    Red outlet already on  Unable to see under mepilex    Jihan Goodson

## 2021-04-29 NOTE — PROGRESS NOTES
P Quality Flow/Interdisciplinary Rounds Progress Note        Quality Flow Rounds held on April 29, 2021    Disciplines Attending:  Bedside Nurse and     Hero Arenas was admitted on 4/28/2021  1:37 PM    Anticipated Discharge Date:  Expected Discharge Date: 05/03/21    Disposition:    Qasim Score:  Qasim Scale Score: 20    Readmission Risk              Risk of Unplanned Readmission:        12           Discussed patient goal for the day, patient clinical progression, and barriers to discharge. The following Goal(s) of the Day/Commitment(s) have been identified:  wean oxygen.       Talita Slaughter  April 29, 2021

## 2021-04-29 NOTE — PROGRESS NOTES
light, extraocular eye movements intact, conjunctivae normal  Neck: neck supple and non tender without mass   Pulmonary/Chest: clear to auscultation bilaterally - no wheezes, +rales or rhonchi, diminished bibasilar air movement, no respiratory distress  Cardiovascular: normal rate, normal S1 and S2 and no carotid bruits  Abdomen: soft, non-tender, non-distended, normal bowel sounds, no masses or organomegaly  Extremities: no cyanosis, no clubbing and no edema, no calf tenderness  Neurologic: no cranial nerve deficit and speech normal        Recent Labs     04/28/21  0841 04/28/21  1540 04/29/21  0515   * 133 134   K 4.7 5.0 4.7   CL 99 99 102   CO2 22 22 20*   BUN 22 26* 28*   CREATININE 1.3* 1.3* 1.0   GLUCOSE 283* 365* 334*   CALCIUM 10.6* 10.8* 10.8*       Recent Labs     04/28/21  0841 04/28/21  1540 04/29/21  0515   WBC 4.3* 3.8* 4.5   RBC 4.64 4.77 4.42   HGB 13.9 14.3 13.2   HCT 40.7 42.1 39.4   MCV 87.7 88.3 89.1   MCH 30.0 30.0 29.9   MCHC 34.2 34.0 33.5   RDW 11.6 11.6 11.6    195 244   MPV 9.8 9.6 9.5       Micro:  No components found for: [facility])    Radiology:   Ct Abdomen Pelvis Wo Contrast Additional Contrast? None    Result Date: 4/28/2021  EXAMINATION: CT OF THE ABDOMEN AND PELVIS WITHOUT CONTRAST 4/28/2021 11:19 am TECHNIQUE: CT of the abdomen and pelvis was performed without the administration of intravenous contrast. Multiplanar reformatted images are provided for review. Dose modulation, iterative reconstruction, and/or weight based adjustment of the mA/kV was utilized to reduce the radiation dose to as low as reasonably achievable. COMPARISON: None.  HISTORY: ORDERING SYSTEM PROVIDED HISTORY: back pain, flank pain, r/o stone TECHNOLOGIST PROVIDED HISTORY: Reason for exam:->back pain, flank pain, r/o stone Additional Contrast?->None Decision Support Exception->Emergency Medical Condition (MA) What reading provider will be dictating this exam?->CRC FINDINGS: Kidneys have normal size, kidneys. 2.  No indication for acute intraperitoneal retroperitoneal process in the abdomen or in the pelvis. 3.  Extensive bilateral pulmonary parenchymal opacities above discussed. See also report of CTA of the chest performed same occasion. Xr Chest (2 Vw)    Result Date: 4/28/2021  EXAMINATION: TWO XRAY VIEWS OF THE CHEST 4/28/2021 9:19 am COMPARISON: None. HISTORY: ORDERING SYSTEM PROVIDED HISTORY: chest pain TECHNOLOGIST PROVIDED HISTORY: Reason for exam:->chest pain What reading provider will be dictating this exam?->CRC FINDINGS: There are multifocal ill define predominantly peripheral ground-glass opacities. There is elevation of the right hemidiaphragm. There are no effusions. Heart size is normal.  Lateral view suggests some sclerotic thoracic vertebral bodies. Multifocal ill-defined infiltrates. These are nonspecific but could reflect a COVID-19 pattern. Clinical correlation is recommended. Questionable sclerotic vertebral bodies. Consider CT chest for further evaluation     Cta Pulmonary W Contrast    Result Date: 4/28/2021  EXAMINATION: CTA OF THE CHEST 4/28/2021 8:19 am TECHNIQUE: CTA of the chest was performed after the administration of intravenous contrast.  Multiplanar reformatted images are provided for review. MIP images are provided for review. Dose modulation, iterative reconstruction, and/or weight based adjustment of the mA/kV was utilized to reduce the radiation dose to as low as reasonably achievable. COMPARISON: Two view chest x-ray 04/28/2021. HISTORY: ORDERING SYSTEM PROVIDED HISTORY: sob, r/o PE TECHNOLOGIST PROVIDED HISTORY: Reason for exam:->sob, r/o PE Decision Support Exception->Emergency Medical Condition (MA) What reading provider will be dictating this exam?->CRC FINDINGS: Limitations: Exam is motion degraded. Pulmonary Arteries: The main pulmonary artery enhancement measures 260 HU (optimal is >200 HU, suboptimal is 100-200 HU, non-diagnostic is <100 HU).  No respiratory failure due to Covid 19 viral pneumonia-   Patient on 4 L of oxygen, baseline is room air. Ct chest -ruled out PE , bilateral ground-glass consolidations and interstitial thickening. Patient had no leukocytosis though neutrophilia. ,   procalcitonin low, urine Legionella and strep antigen - neg, sputum culture & blood cultures pending,  Daily Covid inflammatory labs. Will hold off the antibiotics for now, monitor for signs of  Infection. Will start on Remdesivir, Steroids, vitamin C, vitamin D, zinc.  Anticoagulation per Covid protocol.     2.  Diabetes mellitus - patient on semaglutide-once a week on Saturdays 0.25 mg injection, Metformin 1000 mg, glipizide 5 mg, saxagliptin 5 mg - we will hold all home medications and continue sliding scale along with basal & prandial insulin. consulted endocrinology for further management.     3. Hypertension -on losartan 100 , labetalol 300, as needed hydralazine     4. Hyperlipidemia -on Lipitor 40     5. Hyponatremia -131, likely Covid related -solved with IV hydration. 6.  JETHRO -resolved with IV hydration- possible dehydration, Covid related, avoid nephrotoxins. On losartan     7. Back Pain -CT abdomen and pelvis suggestive of small umbilical hernia, degenerative changes of vertebra. As needed Tylenol for now        Code Status: full  DVT prophylaxis: Lovenox 30 twice daily      NOTE: This report was transcribed using voice recognition software. Every effort was made to ensure accuracy; however, inadvertent computerized transcription errors may be present.   Electronically signed by Robbie John MD on 4/29/2021 at 1:13 PM

## 2021-04-29 NOTE — CARE COORDINATION
+ Covid. Spoke with patient, introduced myself and explained my role as RN case manager. Discussed plan of care and discharge planning. Pt stated that he resides at home with his wife. 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 Pt has not history of HHC or [facility]. Pt currently on oxygen- does not have home O2. Educated pt on the process of testing and qualifying for home oxygen- Pt verbalized understanding. Pt does not wish to choose O2 provider at this time for potential home O2 needs- stated that he will discuss O2 provider options with his daughter who is a  before he makes a decision.      Informed patient that case management and social work will continue to follow to assist with the transition of care

## 2021-04-29 NOTE — ACP (ADVANCE CARE PLANNING)
Advance Care Planning     Advance Care Planning Clinical Specialist  Conversation Note      Date of ACP Conversation: 4/28/2021    Conversation Conducted with: Patient with Decision Making Capacity    ACP Clinical Specialist: Maynor Jackson Decision Maker:     Current Designated Healthcare Decision Maker:     Primary Decision Maker: Yola Martinez - Spouse - [phone]    Secondary Decision Maker: Corrine, [address] Preferences    Hospitalization: \"If your health worsens and it becomes clear that your chance of recovery is unlikely, what would your preference be regarding hospitalization? \"    Choice:  [x] The patient wants hospitalization. [] The patient prefers comfort-focused treatment without hospitalization. Ventilation: \"If you were in your present state of health and suddenly became very ill and were unable to breathe on your own, what would your preference be about the use of a ventilator (breathing machine) if it were available to you? \"      If the patient would desire the use of ventilator (breathing machine), answer \"yes\". If not, \"no\": yes    \"If your health worsens and it becomes clear that your chance of recovery is unlikely, what would your preference be about the use of a ventilator (breathing machine) if it were available to you? \"     Would the patient desire the use of ventilator (breathing machine)?: Patient requested that this decision be left up to his wife       Resuscitation  \"CPR works best to restart the heart when there is a sudden event, like a heart attack, in someone who is otherwise healthy. Unfortunately, CPR does not typically restart the heart for people who have serious health conditions or who are very sick. \"    \"In the event your heart stopped as a result of an underlying serious health condition, would you want attempts to be made to restart your heart (answer \"yes\" for attempt to resuscitate) or would you prefer a natural death (answer \"no\" for do not attempt to resuscitate)? \" Patient requested that this decision be left up to his wife        [x] Yes   [] No   Educated Patient / Ilda Dixon regarding differences between Advance Directives and portable DNR orders.     Length of ACP Conversation in minutes:  20    Conversation Outcomes:  [x] ACP discussion completed  [] Existing advance directive reviewed with patient; no changes to patient's previously recorded wishes  [] New Advance Directive completed  [] Portable Do Not Rescitate prepared for Provider review and signature  [] POLST/POST/MOLST/MOST prepared for Provider review and signature      Follow-up plan:    [] Schedule follow-up conversation to continue planning  [x] Referred individual to Provider for additional questions/concerns   [] Advised patient/agent/surrogate to review completed ACP document and update if needed with changes in condition, patient preferences or care setting    [] This note routed to one or more involved healthcare providers

## 2021-04-30 LAB
ALBUMIN SERPL-MCNC: 3.3 G/DL (ref 3.5–5.2)
ALP BLD-CCNC: 78 U/L (ref 40–129)
ALT SERPL-CCNC: 31 U/L (ref 0–40)
ANION GAP SERPL CALCULATED.3IONS-SCNC: 8 MMOL/L (ref 7–16)
AST SERPL-CCNC: 27 U/L (ref 0–39)
BASOPHILS ABSOLUTE: 0 E9/L (ref 0–0.2)
BASOPHILS RELATIVE PERCENT: 0 % (ref 0–2)
BILIRUB SERPL-MCNC: 0.4 MG/DL (ref 0–1.2)
BUN BLDV-MCNC: 26 MG/DL (ref 6–23)
C-REACTIVE PROTEIN: 2.3 MG/DL (ref 0–0.4)
CALCIUM SERPL-MCNC: 10.4 MG/DL (ref 8.6–10.2)
CHLORIDE BLD-SCNC: 106 MMOL/L (ref 98–107)
CO2: 23 MMOL/L (ref 22–29)
CREAT SERPL-MCNC: 1 MG/DL (ref 0.7–1.2)
D DIMER: 230 NG/ML DDU
EOSINOPHILS ABSOLUTE: 0 E9/L (ref 0.05–0.5)
EOSINOPHILS RELATIVE PERCENT: 0 % (ref 0–6)
GFR AFRICAN AMERICAN: >60
GFR NON-AFRICAN AMERICAN: >60 ML/MIN/1.73
GLUCOSE BLD-MCNC: 298 MG/DL (ref 74–99)
HCT VFR BLD CALC: 36.9 % (ref 37–54)
HEMOGLOBIN: 12.7 G/DL (ref 12.5–16.5)
LYMPHOCYTES ABSOLUTE: 0.39 E9/L (ref 1.5–4)
LYMPHOCYTES RELATIVE PERCENT: 6.1 % (ref 20–42)
MCH RBC QN AUTO: 30.5 PG (ref 26–35)
MCHC RBC AUTO-ENTMCNC: 34.4 % (ref 32–34.5)
MCV RBC AUTO: 88.7 FL (ref 80–99.9)
METER GLUCOSE: 237 MG/DL (ref 74–99)
METER GLUCOSE: 266 MG/DL (ref 74–99)
METER GLUCOSE: 277 MG/DL (ref 74–99)
METER GLUCOSE: 279 MG/DL (ref 74–99)
METER GLUCOSE: 326 MG/DL (ref 74–99)
MONOCYTES ABSOLUTE: 0.2 E9/L (ref 0.1–0.95)
MONOCYTES RELATIVE PERCENT: 2.6 % (ref 2–12)
NEUTROPHILS ABSOLUTE: 5.92 E9/L (ref 1.8–7.3)
NEUTROPHILS RELATIVE PERCENT: 91.3 % (ref 43–80)
NUCLEATED RED BLOOD CELLS: 0.9 /100 WBC
PDW BLD-RTO: 11.8 FL (ref 11.5–15)
PLATELET # BLD: 274 E9/L (ref 130–450)
PMV BLD AUTO: 9.2 FL (ref 7–12)
POTASSIUM REFLEX MAGNESIUM: 5.1 MMOL/L (ref 3.5–5)
RBC # BLD: 4.16 E12/L (ref 3.8–5.8)
RBC # BLD: NORMAL 10*6/UL
SODIUM BLD-SCNC: 137 MMOL/L (ref 132–146)
TOTAL PROTEIN: 6.2 G/DL (ref 6.4–8.3)
WBC # BLD: 6.5 E9/L (ref 4.5–11.5)

## 2021-04-30 PROCEDURE — 6370000000 HC RX 637 (ALT 250 FOR IP): Performed by: INTERNAL MEDICINE

## 2021-04-30 PROCEDURE — 99232 SBSQ HOSP IP/OBS MODERATE 35: CPT | Performed by: STUDENT IN AN ORGANIZED HEALTH CARE EDUCATION/TRAINING PROGRAM

## 2021-04-30 PROCEDURE — 85378 FIBRIN DEGRADE SEMIQUANT: CPT

## 2021-04-30 PROCEDURE — 36415 COLL VENOUS BLD VENIPUNCTURE: CPT

## 2021-04-30 PROCEDURE — 94660 CPAP INITIATION&MGMT: CPT

## 2021-04-30 PROCEDURE — 2060000000 HC ICU INTERMEDIATE R&B

## 2021-04-30 PROCEDURE — 2700000000 HC OXYGEN THERAPY PER DAY

## 2021-04-30 PROCEDURE — 6360000002 HC RX W HCPCS: Performed by: STUDENT IN AN ORGANIZED HEALTH CARE EDUCATION/TRAINING PROGRAM

## 2021-04-30 PROCEDURE — 2500000003 HC RX 250 WO HCPCS: Performed by: STUDENT IN AN ORGANIZED HEALTH CARE EDUCATION/TRAINING PROGRAM

## 2021-04-30 PROCEDURE — 86140 C-REACTIVE PROTEIN: CPT

## 2021-04-30 PROCEDURE — 85025 COMPLETE CBC W/AUTO DIFF WBC: CPT

## 2021-04-30 PROCEDURE — 6370000000 HC RX 637 (ALT 250 FOR IP): Performed by: STUDENT IN AN ORGANIZED HEALTH CARE EDUCATION/TRAINING PROGRAM

## 2021-04-30 PROCEDURE — 82962 GLUCOSE BLOOD TEST: CPT

## 2021-04-30 PROCEDURE — 80053 COMPREHEN METABOLIC PANEL: CPT

## 2021-04-30 PROCEDURE — 2580000003 HC RX 258: Performed by: STUDENT IN AN ORGANIZED HEALTH CARE EDUCATION/TRAINING PROGRAM

## 2021-04-30 RX ADMIN — INSULIN LISPRO 9 UNITS: 100 INJECTION, SOLUTION INTRAVENOUS; SUBCUTANEOUS at 07:55

## 2021-04-30 RX ADMIN — INSULIN LISPRO 12 UNITS: 100 INJECTION, SOLUTION INTRAVENOUS; SUBCUTANEOUS at 11:45

## 2021-04-30 RX ADMIN — ATORVASTATIN CALCIUM 40 MG: 40 TABLET, FILM COATED ORAL at 07:55

## 2021-04-30 RX ADMIN — LOSARTAN POTASSIUM 100 MG: 50 TABLET, FILM COATED ORAL at 07:55

## 2021-04-30 RX ADMIN — DEXAMETHASONE SODIUM PHOSPHATE 6 MG: 4 INJECTION, SOLUTION INTRAMUSCULAR; INTRAVENOUS at 13:58

## 2021-04-30 RX ADMIN — INSULIN LISPRO 3 UNITS: 100 INJECTION, SOLUTION INTRAVENOUS; SUBCUTANEOUS at 20:50

## 2021-04-30 RX ADMIN — LABETALOL HYDROCHLORIDE 300 MG: 100 TABLET, FILM COATED ORAL at 07:55

## 2021-04-30 RX ADMIN — Medication 500 MG: at 07:55

## 2021-04-30 RX ADMIN — INSULIN LISPRO 20 UNITS: 100 INJECTION, SOLUTION INTRAVENOUS; SUBCUTANEOUS at 17:34

## 2021-04-30 RX ADMIN — REMDESIVIR 100 MG: 100 INJECTION, POWDER, LYOPHILIZED, FOR SOLUTION INTRAVENOUS at 13:58

## 2021-04-30 RX ADMIN — Medication 2000 UNITS: at 07:56

## 2021-04-30 RX ADMIN — SODIUM CHLORIDE, PRESERVATIVE FREE 10 ML: 5 INJECTION INTRAVENOUS at 07:56

## 2021-04-30 RX ADMIN — INSULIN GLARGINE 40 UNITS: 100 INJECTION, SOLUTION SUBCUTANEOUS at 20:49

## 2021-04-30 RX ADMIN — INSULIN LISPRO 15 UNITS: 100 INJECTION, SOLUTION INTRAVENOUS; SUBCUTANEOUS at 11:45

## 2021-04-30 RX ADMIN — ZINC SULFATE 220 MG (50 MG) CAPSULE 50 MG: CAPSULE at 07:55

## 2021-04-30 RX ADMIN — SODIUM CHLORIDE, PRESERVATIVE FREE 10 ML: 5 INJECTION INTRAVENOUS at 20:49

## 2021-04-30 RX ADMIN — ENOXAPARIN SODIUM 30 MG: 30 INJECTION SUBCUTANEOUS at 20:48

## 2021-04-30 RX ADMIN — INSULIN GLARGINE 20 UNITS: 100 INJECTION, SOLUTION SUBCUTANEOUS at 09:00

## 2021-04-30 RX ADMIN — SODIUM CHLORIDE, PRESERVATIVE FREE 10 ML: 5 INJECTION INTRAVENOUS at 13:58

## 2021-04-30 RX ADMIN — INSULIN LISPRO 9 UNITS: 100 INJECTION, SOLUTION INTRAVENOUS; SUBCUTANEOUS at 17:38

## 2021-04-30 RX ADMIN — INSULIN LISPRO 15 UNITS: 100 INJECTION, SOLUTION INTRAVENOUS; SUBCUTANEOUS at 07:55

## 2021-04-30 RX ADMIN — LABETALOL HYDROCHLORIDE 300 MG: 100 TABLET, FILM COATED ORAL at 20:49

## 2021-04-30 RX ADMIN — ENOXAPARIN SODIUM 30 MG: 30 INJECTION SUBCUTANEOUS at 07:55

## 2021-04-30 ASSESSMENT — PAIN SCALES - GENERAL
PAINLEVEL_OUTOF10: 0

## 2021-04-30 NOTE — CARE COORDINATION
+ Covid. Pt on 1 L O2- actively weaning. IV remdesivir day 3. Discharge plan remains home with his wife. Pt is independent- anticipate no discharge needs.

## 2021-04-30 NOTE — PROGRESS NOTES
Endocrine Brief Communication Note         Date of admission: 4/28/2021  Date of service: 4/29/2021  Admitting physician: Salima Alvarez MD   Primary Care Physician: Felix Bojorquez MD  Consultant physician: Jarad Cordon MD      Reason for the consultation:  Poorly controlled DM      Subjective:   I have reviewed patient's chart, blood glucose trend & insulin requirement. BG still above goal     Point of care glucose monitoring (Independently reviewed)   Recent Labs     04/28/21  1651 04/28/21  2031 04/29/21  0753 04/29/21  1148 04/29/21  1608   GLUMET 369* 373* 320* 311* 325*       Recommendations:  · Change Lantus to 20 units am and 40 units at night   · Humalog 15 units with meals   · High dose sliding scale with meals and at night      Jarad Cordon MD  Endocrinologist, [address] [place] [address] and Endocrinology   [address] [place] [address], [address] [place] [address],[address] [id number]   Phone: [phone]  Fax: [phone]  -------------------------  An electronic signature was used to authenticate this note.  Emeli Carrion MD on 4/29/2021 at 8:29 PM

## 2021-04-30 NOTE — PLAN OF CARE
Problem: Airway Clearance - Ineffective  Goal: Achieve or maintain patent airway  Outcome: Met This Shift     Problem: Gas Exchange - Impaired  Goal: Absence of hypoxia  Outcome: Met This Shift  Goal: Promote optimal lung function  Outcome: Met This Shift     Problem: Breathing Pattern - Ineffective  Goal: Ability to achieve and maintain a regular respiratory rate  Outcome: Met This Shift     Problem:  Body Temperature -  Risk of, Imbalanced  Goal: Ability to maintain a body temperature within defined limits  Outcome: Met This Shift  Goal: Will regain or maintain usual level of consciousness  Outcome: Met This Shift  Goal: Complications related to the disease process, condition or treatment will be avoided or minimized  Outcome: Met This Shift     Problem: Isolation Precautions - Risk of Spread of Infection  Goal: Prevent transmission of infection  Outcome: Met This Shift     Problem: Nutrition Deficits  Goal: Optimize nutritional status  Outcome: Met This Shift     Problem: Risk for Fluid Volume Deficit  Goal: Maintain normal heart rhythm  Outcome: Met This Shift  Goal: Maintain absence of muscle cramping  Outcome: Met This Shift  Goal: Maintain normal serum potassium, sodium, calcium, phosphorus, and pH  Outcome: Met This Shift     Problem: Loneliness or Risk for Loneliness  Goal: Demonstrate positive use of time alone when socialization is not possible  Outcome: Met This Shift     Problem: Fatigue  Goal: Verbalize increase energy and improved vitality  Outcome: Met This Shift     Problem: Patient Education: Go to Patient Education Activity  Goal: Patient/Family Education  Outcome: Met This Shift

## 2021-04-30 NOTE — PROGRESS NOTES
[facility] Progress Note    Admitting Date and Time: 4/28/2021  1:37 PM  Admit Dx: Pneumonia due to COVID-19 virus [U07.1, J12.82]    Subjective:  Patient is being followed for Pneumonia due to COVID-19 virus [U07.1, J12.82]   Pt feels improvement, continues to be on oxygen. Per RN: Patient desaturates with movement. ROS: denies fever, chills, cp, sob, n/v, HA unless stated above.       insulin lispro  20 Units Subcutaneous TID WC    insulin glargine  20 Units Subcutaneous QAM    insulin glargine  40 Units Subcutaneous Nightly    sodium chloride flush  5-40 mL Intravenous 2 times per day    enoxaparin  30 mg Subcutaneous BID    dexamethasone  6 mg Intravenous Q24H    Vitamin D  2,000 Units Oral Daily    ascorbic acid  500 mg Oral Daily    zinc sulfate  50 mg Oral Daily    insulin lispro  0-18 Units Subcutaneous TID WC    insulin lispro  0-9 Units Subcutaneous Nightly    atorvastatin  40 mg Oral Daily    labetalol  300 mg Oral BID    losartan  100 mg Oral Daily    remdesivir IVPB  100 mg Intravenous Q24H     sodium chloride flush, 5-40 mL, PRN  sodium chloride, 25 mL, PRN  promethazine, 12.5 mg, Q6H PRN    Or  ondansetron, 4 mg, Q6H PRN  polyethylene glycol, 17 g, Daily PRN  acetaminophen, 650 mg, Q6H PRN    Or  acetaminophen, 650 mg, Q6H PRN  guaiFENesin-dextromethorphan, 5 mL, Q4H PRN  glucose, 15 g, PRN  dextrose, 12.5 g, PRN  glucagon (rDNA), 1 mg, PRN  dextrose, 100 mL/hr, PRN  hydrALAZINE, 10 mg, Q6H PRN  sodium chloride, 30 mL, PRN  albuterol-ipratropium, 1 puff, Q6H PRN         Objective:    BP (!) 157/77   Pulse 73   Temp 97.8 °F (36.6 °C) (Oral)   Resp 18   Ht 5' 11\" (1.803 m)   Wt 245 lb (111.1 kg)   SpO2 95%   BMI 34.17 kg/m²     General Appearance: alert and oriented to person, place and time and in no acute distress, 1 L of oxygen via nasal cannula  Skin: warm and dry  Head: normocephalic and atraumatic  Eyes: pupils equal, round, and reactive to light, thickness. There is no renal calculus. There is no obstructive uropathy in the right and left kidneys. No calculus seen along the trajectory of the right and left ureters. There is unremarkable appearance for the bladder. There is no calcifications in the bladder lumen. Prostate gland seminal vesicles appear unremarkable. A simple density cysts seen towards the lower pole of the right kidney and towards the lower pole of the left kidney. There are discrete attenuation of the density of the liver compatible with the a fatty infiltration diffusely. The liver has normal size. The gallbladder is normally distended. The biliary tree and pancreatic ductal systems are not dilated. Scratch the pancreas has unremarkable appearance. The spleen has unremarkable appearance. There is a hypertrophy of the adrenal glands with low density compatible adenomatous hypertrophy of the adrenal glands. There is no acute inflammatory changes in the mid mesentery fat planes, free intraperitoneal air, size indication for bowel obstruction. The appendix seen and has unremarkable appearance. There is no pericolonic inflammatory process. Aorta and IVC appear unremarkable, there is no midline retroperitoneal adenopathy. There is a small midline umbilical hernia of fat contents predominant preperitoneal fat. Degenerative changes are seen in the lumbar spine particularly at the level of a T12-L1 and L5-S1. The degenerative changes of the facet joints are seen predominant at L3-4 and L4-5. Lung bases demonstrate a bilateral infiltrates with areas of thick reticular linear opacities. These findings are also evaluate on the CTA of the chest performed the same location. Differential diagnosis can include chronic interstitial pulmonary process versus a subacute phase of bilateral relative recent acute viral infection to the lung parenchyma. 1.  No evidence for renal calculus or obstructive uropathy in the right or in the left kidneys. 2.  No indication for acute intraperitoneal retroperitoneal process in the abdomen or in the pelvis. 3.  Extensive bilateral pulmonary parenchymal opacities above discussed. See also report of CTA of the chest performed same occasion. Xr Chest (2 Vw)    Result Date: 4/28/2021  EXAMINATION: TWO XRAY VIEWS OF THE CHEST 4/28/2021 9:19 am COMPARISON: None. HISTORY: ORDERING SYSTEM PROVIDED HISTORY: chest pain TECHNOLOGIST PROVIDED HISTORY: Reason for exam:->chest pain What reading provider will be dictating this exam?->CRC FINDINGS: There are multifocal ill define predominantly peripheral ground-glass opacities. There is elevation of the right hemidiaphragm. There are no effusions. Heart size is normal.  Lateral view suggests some sclerotic thoracic vertebral bodies. Multifocal ill-defined infiltrates. These are nonspecific but could reflect a COVID-19 pattern. Clinical correlation is recommended. Questionable sclerotic vertebral bodies. Consider CT chest for further evaluation     Cta Pulmonary W Contrast    Result Date: 4/28/2021  EXAMINATION: CTA OF THE CHEST 4/28/2021 8:19 am TECHNIQUE: CTA of the chest was performed after the administration of intravenous contrast.  Multiplanar reformatted images are provided for review. MIP images are provided for review. Dose modulation, iterative reconstruction, and/or weight based adjustment of the mA/kV was utilized to reduce the radiation dose to as low as reasonably achievable. COMPARISON: Two view chest x-ray 04/28/2021. HISTORY: ORDERING SYSTEM PROVIDED HISTORY: sob, r/o PE TECHNOLOGIST PROVIDED HISTORY: Reason for exam:->sob, r/o PE Decision Support Exception->Emergency Medical Condition (MA) What reading provider will be dictating this exam?->CRC FINDINGS: Limitations: Exam is motion degraded. Pulmonary Arteries: The main pulmonary artery enhancement measures 260 HU (optimal is >200 HU, suboptimal is 100-200 HU, non-diagnostic is <100 HU).  No pulmonary artery emboli are identified. Full evaluation is limited by motion degradation. The main pulmonary artery measures 3 cm in diameter (with diameter >3 cm generally raising concern for pulmonary artery hypertension). Mediastinum: The left atrium measures 3.7 cm in AP diameter (normal is <5 cm). The right atrium measures 6.4 cm in transverse diameter (normal is <7 cm). The left ventricle measures 5.0 cm in transverse diameter (normal is <6 cm). The right ventricle measures 5.8 cm in transverse diameter (normal <6 cm). Evaluation of the ascending aorta is limited by pulsation artifact. There is mild atherosclerosis. No aortic aneurysm or evidence of an acute aortic syndrome. There is mild hilar and mediastinal adenopathy. Lungs/pleura: The lungs are hypoinflated and motion degraded. There are bilateral ground-glass consolidations and interstitial thickening with predominantly peripheral distribution. A small bulla is present in the left upper lobe. No significant pleural effusions or pneumothorax. Upper Abdomen: The abdomen is discussed in a separate report. Soft Tissues/Bones: The visualized superficial soft tissues are unremarkable. Mild-moderate degenerative changes are present involving the thoracic spine with multilevel right anterior osteophytosis and right posterior disc osteophytosis at T6-T7 and T8-T9. No suspicious bone lesions are identified. 1.  No evidence of pulmonary artery embolism on this motion degraded exam. 2. Bilateral ground-glass consolidations and interstitial thickening. Commonly reported imaging features of COVID-19 pneumonia are present. Other processes such as influenza pneumonia and organizing pneumonia (as can be seen with drug toxicity and connective tissue disease) can cause a similar imaging pattern. Assessment:    Active Problems:    Pneumonia due to COVID-19 virus  Resolved Problems:    * No resolved hospital problems. *      Plan:  1.   Acute hypoxic respiratory failure due to Covid 19 viral pneumonia-   Patient on 4 L of oxygen, baseline is room air. Ct chest -ruled out PE , bilateral ground-glass consolidations and interstitial thickening. Patient had no leukocytosis though neutrophilia. ,   procalcitonin low, urine Legionella and strep antigen - neg, sputum culture & blood cultures pending,  Daily Covid inflammatory labs. Will hold off the antibiotics for now, monitor for signs of  Infection. Will start on Remdesivir x day3, Steroids, vitamin C, vitamin D, zinc.  Anticoagulation per Covid protocol.     2.  Diabetes mellitus - patient on semaglutide-once a week on Saturdays 0.25 mg injection, Metformin 1000 mg, glipizide 5 mg, saxagliptin 5 mg - we will hold all home medications and continue sliding scale along with basal & prandial insulin. consulted endocrinology for further management.     3. Hypertension -on losartan 100 , labetalol 300, as needed hydralazine     4. Hyperlipidemia -on Lipitor 40     5. Hyponatremia -131, likely Covid related -solved with IV hydration. 6.  JETHRO -resolved with IV hydration- possible dehydration, Covid related, avoid nephrotoxins. On losartan     7. Back Pain -CT abdomen and pelvis suggestive of small umbilical hernia, degenerative changes of vertebra. As needed Tylenol for now        Code Status: full  DVT prophylaxis: Lovenox 30 twice daily      NOTE: This report was transcribed using voice recognition software. Every effort was made to ensure accuracy; however, inadvertent computerized transcription errors may be present.   Electronically signed by Jam Salinas MD on 4/30/2021 at 1:29 PM

## 2021-04-30 NOTE — CARE COORDINATION
Social Work:    Covid + 4-28. Social work spoke with Vicente Greene, advised him about social work role. Vicente Greene plans return to his two-story home (bed/bath upstairs) where he resides independently with his wife. Vicente Vasquezs advises that his daughter is obtaining a new PCP for him. Social work provided our [facility] on the discharge instruction in case Vicente Greene has problems obtaining a PCP. If home 02 is needed, Vicente Greene has no agency preference. He does have CPAP from [facility]. Social work to follow.     Electronically signed by EDWARD De La Vega on 4/30/2021 at 12:37 PM

## 2021-04-30 NOTE — PROGRESS NOTES
P Quality Flow/Interdisciplinary Rounds Progress Note        Quality Flow Rounds held on April 30, 2021    Disciplines Attending:  Bedside Nurse and     Wale Cooley was admitted on 4/28/2021  1:37 PM    Anticipated Discharge Date:  Expected Discharge Date: 05/03/21    Disposition:    Qasim Score:  Qasim Scale Score: 22    Readmission Risk              Risk of Unplanned Readmission:        13           Discussed patient goal for the day, patient clinical progression, and barriers to discharge. The following Goal(s) of the Day/Commitment(s) have been identified:  wean oxygen.       Grazyna Solders  April 30, 2021

## 2021-05-01 VITALS
SYSTOLIC BLOOD PRESSURE: 162 MMHG | BODY MASS INDEX: 34.3 KG/M2 | HEART RATE: 72 BPM | HEIGHT: 71 IN | WEIGHT: 245 LBS | DIASTOLIC BLOOD PRESSURE: 80 MMHG | TEMPERATURE: 97.6 F | OXYGEN SATURATION: 93 % | RESPIRATION RATE: 18 BRPM

## 2021-05-01 LAB
ALBUMIN SERPL-MCNC: 3.4 G/DL (ref 3.5–5.2)
ALP BLD-CCNC: 82 U/L (ref 40–129)
ALT SERPL-CCNC: 32 U/L (ref 0–40)
ANION GAP SERPL CALCULATED.3IONS-SCNC: 10 MMOL/L (ref 7–16)
AST SERPL-CCNC: 26 U/L (ref 0–39)
ATYPICAL LYMPHOCYTE RELATIVE PERCENT: 3 % (ref 0–4)
BASOPHILS ABSOLUTE: 0 E9/L (ref 0–0.2)
BASOPHILS RELATIVE PERCENT: 0 % (ref 0–2)
BILIRUB SERPL-MCNC: 0.5 MG/DL (ref 0–1.2)
BUN BLDV-MCNC: 22 MG/DL (ref 6–23)
BURR CELLS: ABNORMAL
CALCIUM SERPL-MCNC: 10.5 MG/DL (ref 8.6–10.2)
CHLORIDE BLD-SCNC: 105 MMOL/L (ref 98–107)
CO2: 22 MMOL/L (ref 22–29)
CREAT SERPL-MCNC: 0.9 MG/DL (ref 0.7–1.2)
D DIMER: 232 NG/ML DDU
EOSINOPHILS ABSOLUTE: 0 E9/L (ref 0.05–0.5)
EOSINOPHILS RELATIVE PERCENT: 0 % (ref 0–6)
GFR AFRICAN AMERICAN: >60
GFR NON-AFRICAN AMERICAN: >60 ML/MIN/1.73
GLUCOSE BLD-MCNC: 197 MG/DL (ref 74–99)
HCT VFR BLD CALC: 37 % (ref 37–54)
HEMOGLOBIN: 12.7 G/DL (ref 12.5–16.5)
LYMPHOCYTES ABSOLUTE: 0.67 E9/L (ref 1.5–4)
LYMPHOCYTES RELATIVE PERCENT: 9 % (ref 20–42)
MCH RBC QN AUTO: 30.5 PG (ref 26–35)
MCHC RBC AUTO-ENTMCNC: 34.3 % (ref 32–34.5)
MCV RBC AUTO: 88.7 FL (ref 80–99.9)
METER GLUCOSE: 172 MG/DL (ref 74–99)
METER GLUCOSE: 230 MG/DL (ref 74–99)
MONOCYTES ABSOLUTE: 0.28 E9/L (ref 0.1–0.95)
MONOCYTES RELATIVE PERCENT: 5 % (ref 2–12)
NEUTROPHILS ABSOLUTE: 4.65 E9/L (ref 1.8–7.3)
NEUTROPHILS RELATIVE PERCENT: 83 % (ref 43–80)
OVALOCYTES: ABNORMAL
PDW BLD-RTO: 11.9 FL (ref 11.5–15)
PLATELET # BLD: 310 E9/L (ref 130–450)
PMV BLD AUTO: 9.5 FL (ref 7–12)
POTASSIUM REFLEX MAGNESIUM: 4.5 MMOL/L (ref 3.5–5)
RBC # BLD: 4.17 E12/L (ref 3.8–5.8)
SODIUM BLD-SCNC: 137 MMOL/L (ref 132–146)
TOTAL PROTEIN: 6.2 G/DL (ref 6.4–8.3)
WBC # BLD: 5.6 E9/L (ref 4.5–11.5)

## 2021-05-01 PROCEDURE — 80053 COMPREHEN METABOLIC PANEL: CPT

## 2021-05-01 PROCEDURE — 6360000002 HC RX W HCPCS: Performed by: STUDENT IN AN ORGANIZED HEALTH CARE EDUCATION/TRAINING PROGRAM

## 2021-05-01 PROCEDURE — 36415 COLL VENOUS BLD VENIPUNCTURE: CPT

## 2021-05-01 PROCEDURE — 99239 HOSP IP/OBS DSCHRG MGMT >30: CPT | Performed by: STUDENT IN AN ORGANIZED HEALTH CARE EDUCATION/TRAINING PROGRAM

## 2021-05-01 PROCEDURE — 6370000000 HC RX 637 (ALT 250 FOR IP): Performed by: STUDENT IN AN ORGANIZED HEALTH CARE EDUCATION/TRAINING PROGRAM

## 2021-05-01 PROCEDURE — 85025 COMPLETE CBC W/AUTO DIFF WBC: CPT

## 2021-05-01 PROCEDURE — 6370000000 HC RX 637 (ALT 250 FOR IP): Performed by: INTERNAL MEDICINE

## 2021-05-01 PROCEDURE — 94660 CPAP INITIATION&MGMT: CPT

## 2021-05-01 PROCEDURE — 85378 FIBRIN DEGRADE SEMIQUANT: CPT

## 2021-05-01 PROCEDURE — 82962 GLUCOSE BLOOD TEST: CPT

## 2021-05-01 PROCEDURE — 2580000003 HC RX 258: Performed by: STUDENT IN AN ORGANIZED HEALTH CARE EDUCATION/TRAINING PROGRAM

## 2021-05-01 RX ORDER — ZINC SULFATE 50(220)MG
50 CAPSULE ORAL DAILY
Qty: 30 CAPSULE | Refills: 3 | COMMUNITY
Start: 2021-05-02 | End: 2021-05-12

## 2021-05-01 RX ORDER — ASCORBIC ACID 500 MG
500 TABLET ORAL DAILY
Qty: 10 TABLET | Refills: 0 | Status: SHIPPED | OUTPATIENT
Start: 2021-05-02 | End: 2021-05-12

## 2021-05-01 RX ORDER — GUAIFENESIN/DEXTROMETHORPHAN 100-10MG/5
5 SYRUP ORAL EVERY 4 HOURS PRN
Qty: 120 ML | Refills: 0 | Status: SHIPPED | OUTPATIENT
Start: 2021-05-01 | End: 2021-05-11

## 2021-05-01 RX ORDER — CHOLECALCIFEROL (VITAMIN D3) 50 MCG
2000 TABLET ORAL DAILY
Qty: 60 TABLET | Refills: 0 | Status: SHIPPED | OUTPATIENT
Start: 2021-05-02 | End: 2021-05-12

## 2021-05-01 RX ORDER — INSULIN GLARGINE 100 [IU]/ML
20 INJECTION, SOLUTION SUBCUTANEOUS 2 TIMES DAILY
Status: DISCONTINUED | OUTPATIENT
Start: 2021-05-01 | End: 2021-05-01 | Stop reason: HOSPADM

## 2021-05-01 RX ORDER — DEXAMETHASONE 6 MG/1
6 TABLET ORAL EVERY 6 HOURS
Qty: 40 TABLET | Refills: 0 | Status: SHIPPED | OUTPATIENT
Start: 2021-05-01 | End: 2021-05-11

## 2021-05-01 RX ADMIN — ENOXAPARIN SODIUM 30 MG: 30 INJECTION SUBCUTANEOUS at 07:52

## 2021-05-01 RX ADMIN — ATORVASTATIN CALCIUM 40 MG: 40 TABLET, FILM COATED ORAL at 07:53

## 2021-05-01 RX ADMIN — Medication 500 MG: at 07:53

## 2021-05-01 RX ADMIN — SODIUM CHLORIDE, PRESERVATIVE FREE 10 ML: 5 INJECTION INTRAVENOUS at 07:56

## 2021-05-01 RX ADMIN — LOSARTAN POTASSIUM 100 MG: 50 TABLET, FILM COATED ORAL at 07:53

## 2021-05-01 RX ADMIN — INSULIN GLARGINE 20 UNITS: 100 INJECTION, SOLUTION SUBCUTANEOUS at 08:05

## 2021-05-01 RX ADMIN — LABETALOL HYDROCHLORIDE 300 MG: 100 TABLET, FILM COATED ORAL at 07:53

## 2021-05-01 RX ADMIN — INSULIN LISPRO 15 UNITS: 100 INJECTION, SOLUTION INTRAVENOUS; SUBCUTANEOUS at 12:26

## 2021-05-01 RX ADMIN — INSULIN LISPRO 20 UNITS: 100 INJECTION, SOLUTION INTRAVENOUS; SUBCUTANEOUS at 08:04

## 2021-05-01 RX ADMIN — INSULIN LISPRO 6 UNITS: 100 INJECTION, SOLUTION INTRAVENOUS; SUBCUTANEOUS at 12:25

## 2021-05-01 RX ADMIN — ZINC SULFATE 220 MG (50 MG) CAPSULE 50 MG: CAPSULE at 07:53

## 2021-05-01 RX ADMIN — Medication 2000 UNITS: at 07:55

## 2021-05-01 RX ADMIN — INSULIN LISPRO 4 UNITS: 100 INJECTION, SOLUTION INTRAVENOUS; SUBCUTANEOUS at 08:03

## 2021-05-01 ASSESSMENT — PAIN SCALES - GENERAL
PAINLEVEL_OUTOF10: 0
PAINLEVEL_OUTOF10: 0

## 2021-05-01 NOTE — PROGRESS NOTES
Endocrine Brief Communication Note         Date of admission: 4/28/2021  Date of service: 5/1/2021  Admitting physician: Mary Rodriguez MD   Primary Care Physician: Pretty Whitt MD  Consultant physician: Gopal Teague MD      Reason for the consultation:  Poorly controlled DM      Subjective:   I have reviewed patient's chart, blood glucose trend & insulin requirement. BG improving     Point of care glucose monitoring (Independently reviewed)   Recent Labs     04/29/21  1608 04/29/21  2054 04/30/21  0617 04/30/21  0753 04/30/21  1153 04/30/21  1737 04/30/21  2043 05/01/21  0645   GLUMET 325* 357* 279* 277* 326* 266* 237* 172*       Recommendations:  · We recommend change DM regimen to   ·  Lantus to 20 units BID   · Humalog 15 units with meals   · High dose sliding scale with meals and at night   ·  on discharge, pr doesn't want to go home on insulin. We recommend the following dc regimen Glyburide 10 mg daily, Onglyza 5 mg daily, Ozempic 0.5 mg/wk     Gopal Teague MD  Endocrinologist, [facility] and Endocrinology   [address] [place] [address]   Phone: [phone]  Fax: [phone]  -------------------------  An electronic signature was used to authenticate this note.  Alfredito Almaguer MD on 5/1/2021 at 11:23 AM

## 2021-05-03 ENCOUNTER — CARE COORDINATION (OUTPATIENT)
Dept: CASE MANAGEMENT | Age: 66
End: 2021-05-03

## 2021-05-03 NOTE — CARE COORDINATION
Patient contacted regarding [id number] diagnosis\". Discussed COVID-19 related testing which was available at this time. Test results were positive. Patient informed of results, if available? Already aware     Care Transition Nurse contacted the patient by telephone to perform post discharge assessment. Call within 2 business days of discharge: Yes. Verified name and  with patient as identifiers. Provided introduction to self, and explanation of the CTN/ACM role, and reason for call due to risk factors for infection and/or exposure to COVID-19. Symptoms reviewed with patient who verbalized the following symptoms: no new symptoms and no worsening symptoms. Due to no new or worsening symptoms encounter was not routed to provider for escalation. Discussed follow-up appointments. If no appointment was previously scheduled, appointment scheduling offered: STATED THE PLAN IS FOR HIS DAUGHTER TO SCHEDULE HIM WITH A NEW PCP, HE IS UNSURE OF WHO IT WILL BE. HE DOES PLAN TO CALL THE [facility] PHYSICIAN REFERRAL LINE IF SHE IS UNABLE TO SCHEDULE HIM WITH A PROVIDER. HE DECLINES NEEDING ASSISTANCE WITH SCHEDULING. CTN CONFIRMED THAT THE REFERRAL LINE NUMBER IS ON PAGE 8 OF HIS DISCHARGE INSTRUCTIONS. Non-face-to-face services provided:  Obtained and reviewed discharge summary and/or continuity of care documents     Advance Care Planning:   Does patient have an Advance Directive:  reviewed and current. Patient has following risk factors of: pneumonia. CTN reviewed discharge instructions, medical action plan and red flags such as increased shortness of breath, increasing fever and signs of decompensation with patient who verbalized understanding. Discussed exposure protocols and quarantine with CDC Guidelines What to do if you are sick with coronavirus disease 2019.  Patient was given an opportunity for questions and concerns.  The patient agrees to contact the [facility] exposure line [phone], [facility] [address] Department of [facility]: ([phone]) and PCP office for questions related to their healthcare. CTN provided contact information for future needs. Reviewed and educated patient on any new and changed medications related to discharge diagnosis     Was patient discharged with a pulse oximeter? No    Spoke with [facility] for initial COVID monitoring call post hospital discharge. He reports feeling \"a lot better\". He reports mild FIORE and denies SOB at rest. He is doing deep breathing exercises. He is able to move around his house slowly without needing to stop to catch his breath. He stated that his BS readings are \"normalizing\" but was unable to give an exact reading at this time. [facility] denies any needs, questions, or concerns at this time. Plan for follow-up call in 5-7 days based on severity of symptoms and risk factors.

## 2021-05-10 ENCOUNTER — CARE COORDINATION (OUTPATIENT)
Dept: CASE MANAGEMENT | Age: 66
End: 2021-05-10

## 2021-05-10 NOTE — CARE COORDINATION
COVID-19 Monitoring Follow-up Note      Attempted to reach the patient for follow up [facility] (positive) Care Transition call post hospital discharge. Message left with CTN's contact information requesting return phone call.

## 2021-05-17 ENCOUNTER — CARE COORDINATION (OUTPATIENT)
Dept: CASE MANAGEMENT | Age: 66
End: 2021-05-17

## 2021-05-17 NOTE — CARE COORDINATION
Patient contacted regarding COVID-19 risk, exposure, pulse oximeter ordered at discharge and monoclonal antibody infusion follow up. Discussed COVID-19 related testing which was available at this time. Test results were positive. Patient informed of results, if available? Patient aware    LPN Care Coordinator contacted the family by telephone to perform follow-up assessment. Verified name and  with family as identifiers. Patient has following risk factors of: no known risk factors. Symptoms reviewed with family who verbalized the following symptoms: fatigue. Due to no new or worsening symptoms encounter was not routed to provider for escalation. Educated patient about risk for severe COVID-19 due to risk factors according to CDC guidelines. LPN CC reviewed discharge instructions, medical action plan and red flag symptoms family who verbalized understanding. Discussed COVID vaccination status No. Education provided on COVID-19 vaccination as appropriate. Discussed exposure protocols and quarantine with CDC Guidelines. Family was given an opportunity to verbalize any questions and concerns and agrees to contact LPN CC or health care provider for questions related to their healthcare. Was patient discharged with a pulse oximeter? Wife was not aware Discussed and confirmed pulse oximeter discharge instructions and when to notify provider or seek emergency care. LPN CC provided contact information. No further follow-up call identified based on severity of symptoms and risk factors. Patients wife Peña Lo states he is back to work had a negative covid test last week continues to be fatigued at times. No SOB, Cough, fever, chills noted no concerns.